# Patient Record
Sex: FEMALE | Race: BLACK OR AFRICAN AMERICAN | NOT HISPANIC OR LATINO | Employment: UNEMPLOYED | URBAN - METROPOLITAN AREA
[De-identification: names, ages, dates, MRNs, and addresses within clinical notes are randomized per-mention and may not be internally consistent; named-entity substitution may affect disease eponyms.]

---
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
## 2024-03-15 ENCOUNTER — HOSPITAL ENCOUNTER (EMERGENCY)
Facility: HOSPITAL | Age: 19
Discharge: LEFT BEFORE DISCHARGE | End: 2024-03-16

## 2024-03-15 VITALS
WEIGHT: 180.75 LBS | HEART RATE: 116 BPM | OXYGEN SATURATION: 99 % | DIASTOLIC BLOOD PRESSURE: 81 MMHG | TEMPERATURE: 100 F | SYSTOLIC BLOOD PRESSURE: 122 MMHG | RESPIRATION RATE: 16 BRPM

## 2024-03-15 DIAGNOSIS — R06.02 SOB (SHORTNESS OF BREATH): ICD-10-CM

## 2024-03-15 PROCEDURE — 99281 EMR DPT VST MAYX REQ PHY/QHP: CPT

## 2024-03-15 RX ORDER — IBUPROFEN 800 MG/1
800 TABLET ORAL
Status: DISCONTINUED | OUTPATIENT
Start: 2024-03-15 | End: 2024-03-16 | Stop reason: HOSPADM

## 2024-03-15 NOTE — FIRST PROVIDER EVALUATION
Medical screening examination initiated.  I have conducted a focused provider triage encounter, findings are as follows:    Brief history of present illness:  18-year-old female presents to emergency room for chest pain and shortness of breath x3 days    Vitals:    03/15/24 1854   BP: 122/81   BP Location: Right arm   Patient Position: Sitting   Pulse: (!) 116   Resp: 16   Temp: 100.3 °F (37.9 °C)   TempSrc: Oral   SpO2: 99%   Weight: 82 kg (180 lb 12.4 oz)       Pertinent physical exam:  Patient is sitting in family members lap, tachycardic    Brief workup plan:  Swabs x-ray    Preliminary workup initiated; this workup will be continued and followed by the physician or advanced practice provider that is assigned to the patient when roomed.

## 2024-03-17 LAB
OHS QRS DURATION: 72 MS
OHS QTC CALCULATION: 414 MS

## 2025-04-19 ENCOUNTER — HOSPITAL ENCOUNTER (EMERGENCY)
Facility: HOSPITAL | Age: 20
Discharge: HOME | End: 2025-04-19
Attending: EMERGENCY MEDICINE

## 2025-04-19 VITALS
OXYGEN SATURATION: 100 % | DIASTOLIC BLOOD PRESSURE: 74 MMHG | HEART RATE: 80 BPM | HEIGHT: 62 IN | BODY MASS INDEX: 31.65 KG/M2 | TEMPERATURE: 98 F | SYSTOLIC BLOOD PRESSURE: 128 MMHG | RESPIRATION RATE: 18 BRPM | WEIGHT: 172 LBS

## 2025-04-19 DIAGNOSIS — K59.00 CONSTIPATION DURING PREGNANCY IN FIRST TRIMESTER: Primary | ICD-10-CM

## 2025-04-19 DIAGNOSIS — O99.611 CONSTIPATION DURING PREGNANCY IN FIRST TRIMESTER: Primary | ICD-10-CM

## 2025-04-19 LAB
ABSOLUTE EOSINOPHIL (OHS): 0.02 K/UL
ABSOLUTE MONOCYTE (OHS): 0.35 K/UL (ref 0.3–1)
ABSOLUTE NEUTROPHIL COUNT (OHS): 4.51 K/UL (ref 1.8–7.7)
ALBUMIN SERPL BCP-MCNC: 4.1 G/DL (ref 3.5–5.2)
ALP SERPL-CCNC: 17 UNIT/L (ref 40–150)
ALT SERPL W/O P-5'-P-CCNC: 11 UNIT/L (ref 10–44)
ANION GAP (OHS): 10 MMOL/L (ref 8–16)
AST SERPL-CCNC: 14 UNIT/L (ref 11–45)
BASOPHILS # BLD AUTO: 0.01 K/UL
BASOPHILS NFR BLD AUTO: 0.2 %
BILIRUB SERPL-MCNC: 0.3 MG/DL (ref 0.1–1)
BILIRUB UR QL STRIP.AUTO: NEGATIVE
BUN SERPL-MCNC: 4 MG/DL (ref 6–20)
CALCIUM SERPL-MCNC: 9.6 MG/DL (ref 8.7–10.5)
CHLORIDE SERPL-SCNC: 102 MMOL/L (ref 95–110)
CLARITY UR: CLEAR
CO2 SERPL-SCNC: 22 MMOL/L (ref 23–29)
COLOR UR AUTO: YELLOW
CREAT SERPL-MCNC: 0.7 MG/DL (ref 0.5–1.4)
ERYTHROCYTE [DISTWIDTH] IN BLOOD BY AUTOMATED COUNT: 11.2 % (ref 11.5–14.5)
GFR SERPLBLD CREATININE-BSD FMLA CKD-EPI: >60 ML/MIN/1.73/M2
GLUCOSE SERPL-MCNC: 84 MG/DL (ref 70–110)
GLUCOSE UR QL STRIP: NEGATIVE
HCG INTACT+B SERPL-ACNC: NORMAL MIU/ML
HCT VFR BLD AUTO: 41.7 % (ref 37–48.5)
HCV AB SERPL QL IA: NEGATIVE
HGB BLD-MCNC: 14.4 GM/DL (ref 12–16)
HGB UR QL STRIP: NEGATIVE
HIV 1+2 AB+HIV1 P24 AG SERPL QL IA: NEGATIVE
HOLD SPECIMEN: NORMAL
IMM GRANULOCYTES # BLD AUTO: 0.02 K/UL (ref 0–0.04)
IMM GRANULOCYTES NFR BLD AUTO: 0.3 % (ref 0–0.5)
KETONES UR QL STRIP: ABNORMAL
LEUKOCYTE ESTERASE UR QL STRIP: NEGATIVE
LYMPHOCYTES # BLD AUTO: 1.66 K/UL (ref 1–4.8)
MCH RBC QN AUTO: 30.8 PG (ref 27–31)
MCHC RBC AUTO-ENTMCNC: 34.5 G/DL (ref 32–36)
MCV RBC AUTO: 89 FL (ref 82–98)
NITRITE UR QL STRIP: NEGATIVE
NUCLEATED RBC (/100WBC) (OHS): 0 /100 WBC
PH UR STRIP: 6 [PH]
PLATELET # BLD AUTO: 258 K/UL (ref 150–450)
PMV BLD AUTO: 9.6 FL (ref 9.2–12.9)
POTASSIUM SERPL-SCNC: 3.6 MMOL/L (ref 3.5–5.1)
PROT SERPL-MCNC: 8.1 GM/DL (ref 6–8.4)
PROT UR QL STRIP: NEGATIVE
RBC # BLD AUTO: 4.67 M/UL (ref 4–5.4)
RELATIVE EOSINOPHIL (OHS): 0.3 %
RELATIVE LYMPHOCYTE (OHS): 25.3 % (ref 18–48)
RELATIVE MONOCYTE (OHS): 5.3 % (ref 4–15)
RELATIVE NEUTROPHIL (OHS): 68.6 % (ref 38–73)
SODIUM SERPL-SCNC: 134 MMOL/L (ref 136–145)
SP GR UR STRIP: 1.02
UROBILINOGEN UR STRIP-ACNC: NEGATIVE EU/DL
WBC # BLD AUTO: 6.57 K/UL (ref 3.9–12.7)

## 2025-04-19 PROCEDURE — 82040 ASSAY OF SERUM ALBUMIN: CPT | Performed by: NURSE PRACTITIONER

## 2025-04-19 PROCEDURE — 81003 URINALYSIS AUTO W/O SCOPE: CPT | Performed by: NURSE PRACTITIONER

## 2025-04-19 PROCEDURE — 86803 HEPATITIS C AB TEST: CPT | Performed by: NURSE PRACTITIONER

## 2025-04-19 PROCEDURE — 87389 HIV-1 AG W/HIV-1&-2 AB AG IA: CPT | Performed by: NURSE PRACTITIONER

## 2025-04-19 PROCEDURE — 99283 EMERGENCY DEPT VISIT LOW MDM: CPT

## 2025-04-19 PROCEDURE — 85025 COMPLETE CBC W/AUTO DIFF WBC: CPT | Performed by: NURSE PRACTITIONER

## 2025-04-19 PROCEDURE — 84702 CHORIONIC GONADOTROPIN TEST: CPT | Performed by: NURSE PRACTITIONER

## 2025-04-19 RX ORDER — PSYLLIUM SEED
1 PACKET (EA) ORAL 2 TIMES DAILY PRN
Qty: 35 G | Refills: 0 | Status: SHIPPED | OUTPATIENT
Start: 2025-04-19

## 2025-04-20 NOTE — ED PROVIDER NOTES
Encounter Date: 4/19/2025       History     Chief Complaint   Patient presents with    Constipation     Patient 8-10 weeks pregnant, complaining of constipation and abdominal cramping. Tried over the counter medications with no relief. Denies any vaginal bleeding or discharge     19-year-old female presents emergency department for lower abdominal cramping and constipation and pregnancy.  Patient reports she has been taking stool softeners with minimal relief.  Patient reports she is approximately 8 weeks pregnant.  Patient denies any fever, chills, chest pain, shortness of breath, back pain, nausea, vomiting, dysuria, vaginal bleeding, vaginal discharge, and all other concerns at this time.  Patient was recently seen on April 6th had a different health care facility.  Patient was diagnosed with a intrauterine pregnancy at this time.    The history is provided by the patient and a parent. No  was used.     Review of patient's allergies indicates:  No Known Allergies  History reviewed. No pertinent past medical history.  History reviewed. No pertinent surgical history.  No family history on file.  Social History[1]  Review of Systems   Constitutional:  Negative for fever.   HENT:  Negative for sore throat.    Respiratory:  Negative for shortness of breath.    Cardiovascular:  Negative for chest pain.   Gastrointestinal:  Positive for abdominal pain. Negative for nausea and vomiting.   Genitourinary:  Negative for dysuria, vaginal bleeding and vaginal discharge.   Musculoskeletal:  Negative for back pain.   Skin:  Negative for rash.   Neurological:  Negative for weakness.   Hematological:  Does not bruise/bleed easily.       Physical Exam     Initial Vitals [04/19/25 1502]   BP Pulse Resp Temp SpO2   127/70 79 16 98.2 °F (36.8 °C) 100 %      MAP       --         Physical Exam    Nursing note and vitals reviewed.  Constitutional: She appears well-developed and well-nourished. She is not diaphoretic.  No distress.   HENT:   Head: Normocephalic and atraumatic.   Eyes: Right eye exhibits no discharge. Left eye exhibits no discharge.   Neck: Neck supple.   Normal range of motion.  Cardiovascular:  Normal rate.           Pulmonary/Chest: No respiratory distress.   Abdominal: She exhibits no distension.   Musculoskeletal:         General: Normal range of motion.      Cervical back: Normal range of motion and neck supple.     Neurological: She is alert and oriented to person, place, and time. She has normal strength.   Skin: Skin is warm and dry.   Psychiatric: She has a normal mood and affect. Her behavior is normal. Thought content normal.         ED Course   Procedures  Labs Reviewed   COMPREHENSIVE METABOLIC PANEL - Abnormal       Result Value    Sodium 134 (*)     Potassium 3.6      Chloride 102      CO2 22 (*)     Glucose 84      BUN 4 (*)     Creatinine 0.7      Calcium 9.6      Protein Total 8.1      Albumin 4.1      Bilirubin Total 0.3      ALP 17 (*)     AST 14      ALT 11      Anion Gap 10      eGFR >60     URINALYSIS, REFLEX TO URINE CULTURE - Abnormal    Color, UA Yellow      Appearance, UA Clear      pH, UA 6.0      Spec Grav UA 1.020      Protein, UA Negative      Glucose, UA Negative      Ketones, UA 2+ (*)     Bilirubin, UA Negative      Blood, UA Negative      Nitrites, UA Negative      Urobilinogen, UA Negative      Leukocyte Esterase, UA Negative     CBC WITH DIFFERENTIAL - Abnormal    WBC 6.57      RBC 4.67      HGB 14.4      HCT 41.7      MCV 89      MCH 30.8      MCHC 34.5      RDW 11.2 (*)     Platelet Count 258      MPV 9.6      Nucleated RBC 0      Neut % 68.6      Lymph % 25.3      Mono % 5.3      Eos % 0.3      Basophil % 0.2      Imm Grans % 0.3      Neut # 4.51      Lymph # 1.66      Mono # 0.35      Eos # 0.02      Baso # 0.01      Imm Grans # 0.02     HEPATITIS C ANTIBODY - Normal    Hep C Ab Interp Negative     HIV 1 / 2 ANTIBODY - Normal    HIV 1/2 Ag/Ab Negative     CBC W/ AUTO  DIFFERENTIAL    Narrative:     The following orders were created for panel order CBC auto differential.  Procedure                               Abnormality         Status                     ---------                               -----------         ------                     CBC with Differential[[id number]]        Abnormal            Final result                 Please view results for these tests on the individual orders.   HCG, QUANTITATIVE    Beta HCG Quant 55,356.27     GREY TOP URINE HOLD    Extra Tube Hold for add-ons.     HEP C VIRUS HOLD SPECIMEN          Imaging Results    None          Medications - No data to display  Medical Decision Making  Amount and/or Complexity of Data Reviewed  Labs: ordered.    Risk  OTC drugs.                                      Clinical Impression:  Final diagnoses:  [O99.611, K59.00] Constipation during pregnancy in first trimester (Primary)          ED Disposition Condition    Discharge Stable          ED Prescriptions       Medication Sig Dispense Start Date End Date Auth. Provider    psyllium husk (METAMUCIL) 3.4 gram/5.4 gram Powd Take 1 packet by mouth 2 (two) times daily as needed. 35 g 4/19/2025 -- Tacos Galeas NP          Follow-up Information       Follow up With Specialties Details Why Contact Info    pcp of choice   As needed     [facility] - Emergency Dept. Emergency Medicine  If symptoms worsen [address] [place] [address] [phone]               [1]   Social History  Tobacco Use    Smoking status: Never     Passive exposure: Never    Smokeless tobacco: Never   Substance Use Topics    Alcohol use: Never    Drug use: Never        Tacos Galeas NP  04/19/25 5790

## 2025-07-19 ENCOUNTER — HOSPITAL ENCOUNTER (OUTPATIENT)
Facility: HOSPITAL | Age: 20
Discharge: HOME | End: 2025-07-19
Attending: OBSTETRICS & GYNECOLOGY | Admitting: OBSTETRICS & GYNECOLOGY
Payer: MEDICAID

## 2025-07-19 VITALS
TEMPERATURE: 98 F | SYSTOLIC BLOOD PRESSURE: 109 MMHG | HEART RATE: 78 BPM | DIASTOLIC BLOOD PRESSURE: 68 MMHG | OXYGEN SATURATION: 100 %

## 2025-07-19 DIAGNOSIS — B96.89 BV (BACTERIAL VAGINOSIS): ICD-10-CM

## 2025-07-19 DIAGNOSIS — N76.0 BV (BACTERIAL VAGINOSIS): ICD-10-CM

## 2025-07-19 DIAGNOSIS — N93.9 VAGINAL SPOTTING: Primary | ICD-10-CM

## 2025-07-19 DIAGNOSIS — R10.9 ABDOMINAL CRAMPING: ICD-10-CM

## 2025-07-19 PROBLEM — R11.10 VOMITING AND DIARRHEA: Status: ACTIVE | Noted: 2025-07-19

## 2025-07-19 PROBLEM — R19.7 VOMITING AND DIARRHEA: Status: ACTIVE | Noted: 2025-07-19

## 2025-07-19 LAB
BACTERIA #/AREA URNS AUTO: ABNORMAL /HPF
BILIRUB UR QL STRIP.AUTO: NEGATIVE
CLARITY UR: ABNORMAL
COLOR UR AUTO: YELLOW
GLUCOSE UR QL STRIP: NEGATIVE
HGB UR QL STRIP: NEGATIVE
KETONES UR QL STRIP: NEGATIVE
LEUKOCYTE ESTERASE UR QL STRIP: ABNORMAL
MICROSCOPIC COMMENT: ABNORMAL
NITRITE UR QL STRIP: NEGATIVE
PH UR STRIP: 7 [PH]
PROT UR QL STRIP: ABNORMAL
RBC #/AREA URNS AUTO: 1 /HPF (ref 0–4)
SP GR UR STRIP: 1.02
SQUAMOUS #/AREA URNS AUTO: 62 /HPF
UROBILINOGEN UR STRIP-ACNC: NEGATIVE EU/DL
WBC #/AREA URNS AUTO: 19 /HPF (ref 0–5)

## 2025-07-19 PROCEDURE — 96374 THER/PROPH/DIAG INJ IV PUSH: CPT

## 2025-07-19 PROCEDURE — 96365 THER/PROPH/DIAG IV INF INIT: CPT

## 2025-07-19 PROCEDURE — 63600175 PHARM REV CODE 636 W HCPCS

## 2025-07-19 PROCEDURE — 87086 URINE CULTURE/COLONY COUNT: CPT

## 2025-07-19 PROCEDURE — 99211 OFF/OP EST MAY X REQ PHY/QHP: CPT

## 2025-07-19 PROCEDURE — 96360 HYDRATION IV INFUSION INIT: CPT

## 2025-07-19 PROCEDURE — 99214 OFFICE O/P EST MOD 30 MIN: CPT | Mod: TH,,,

## 2025-07-19 PROCEDURE — 81001 URINALYSIS AUTO W/SCOPE: CPT

## 2025-07-19 PROCEDURE — 25000003 PHARM REV CODE 250

## 2025-07-19 RX ORDER — LOPERAMIDE HYDROCHLORIDE 2 MG/1
2 CAPSULE ORAL 4 TIMES DAILY PRN
Status: DISCONTINUED | OUTPATIENT
Start: 2025-07-19 | End: 2025-07-19 | Stop reason: HOSPADM

## 2025-07-19 RX ORDER — ACETAMINOPHEN 500 MG
500 TABLET ORAL EVERY 6 HOURS PRN
COMMUNITY

## 2025-07-19 RX ORDER — METRONIDAZOLE 500 MG/100ML
500 INJECTION, SOLUTION INTRAVENOUS ONCE
Status: COMPLETED | OUTPATIENT
Start: 2025-07-19 | End: 2025-07-19

## 2025-07-19 RX ORDER — ONDANSETRON HYDROCHLORIDE 2 MG/ML
4 INJECTION, SOLUTION INTRAVENOUS ONCE
Status: COMPLETED | OUTPATIENT
Start: 2025-07-19 | End: 2025-07-19

## 2025-07-19 RX ORDER — METRONIDAZOLE 7.5 MG/G
1 GEL VAGINAL 2 TIMES DAILY
Qty: 70 G | Refills: 0 | Status: SHIPPED | OUTPATIENT
Start: 2025-07-19 | End: 2025-07-26

## 2025-07-19 RX ORDER — ONDANSETRON 4 MG/1
4 TABLET, ORALLY DISINTEGRATING ORAL EVERY 6 HOURS PRN
Qty: 60 TABLET | Refills: 0 | Status: SHIPPED | OUTPATIENT
Start: 2025-07-19

## 2025-07-19 RX ADMIN — ONDANSETRON 4 MG: 2 INJECTION INTRAMUSCULAR; INTRAVENOUS at 09:07

## 2025-07-19 RX ADMIN — METRONIDAZOLE 500 MG: 5 INJECTION, SOLUTION INTRAVENOUS at 09:07

## 2025-07-19 RX ADMIN — LOPERAMIDE HYDROCHLORIDE 2 MG: 2 CAPSULE ORAL at 08:07

## 2025-07-19 RX ADMIN — SODIUM CHLORIDE, POTASSIUM CHLORIDE, SODIUM LACTATE AND CALCIUM CHLORIDE 1000 ML: 600; 310; 30; 20 INJECTION, SOLUTION INTRAVENOUS at 09:07

## 2025-07-19 NOTE — HPI
19 y.o.  19w3d with c/o abdominal cramping with spotting yesterday. Then started with N/V/D this morning. Has not tried to eat yet.

## 2025-07-19 NOTE — DISCHARGE INSTRUCTIONS

## 2025-07-19 NOTE — ASSESSMENT & PLAN NOTE
EFM  Speculum exam performed. Small amount of thick white discharge noted in vaginal vault.  Cervix appears closed. No evidence of bleeding noted. Wet prep collected. +clue cells  Will treat for BV   UA

## 2025-07-19 NOTE — SUBJECTIVE & OBJECTIVE
Obstetric HPI:  Patient reports cramping, active fetal movement, Yes vaginal bleeding , No loss of fluid     This pregnancy has been uncomplicated    OB History    Para Term  AB Living   1 0 0 0 0 0   SAB IAB Ectopic Multiple Live Births   0 0 0 0 0      # Outcome Date GA Lbr Deric/2nd Weight Sex Type Anes PTL Lv   1 Current              History reviewed. No pertinent past medical history.  History reviewed. No pertinent surgical history.    PTA Medications   Medication Sig    acetaminophen (TYLENOL) 500 MG tablet Take 500 mg by mouth every 6 (six) hours as needed for Pain.    psyllium husk (METAMUCIL) 3.4 gram/5.4 gram Powd Take 1 packet by mouth 2 (two) times daily as needed.       Review of patient's allergies indicates:  No Known Allergies     Family History    None       Tobacco Use    Smoking status: Never     Passive exposure: Never    Smokeless tobacco: Never   Substance and Sexual Activity    Alcohol use: Never    Drug use: Never    Sexual activity: Yes     Partners: Male     Review of Systems   Gastrointestinal:  Positive for abdominal pain.   Genitourinary:  Positive for vaginal bleeding.   All other systems reviewed and are negative.     Objective:     Vital Signs (Most Recent):  Pulse: 75 (25 0717)  BP: (!) 113/59 (25 0717)  SpO2: 100 % (25 0716) Vital Signs (24h Range):  Pulse:  [51-79] 75  SpO2:  [76 %-100 %] 100 %  BP: (113-118)/(57-59) 113/59        There is no height or weight on file to calculate BMI.    FHT: 140  TOCO:  none     Physical Exam:   Constitutional: She is oriented to person, place, and time. She appears well-developed and well-nourished.       Cardiovascular:  Normal rate.             Pulmonary/Chest: Effort normal. No respiratory distress.        Abdominal: Soft.     Genitourinary:    Uterus normal.   There is vaginal discharge in the vagina.           Musculoskeletal: Moves all extremeties. No edema.       Neurological: She is alert and oriented to  "person, place, and time.    Skin: Skin is warm and dry.    Psychiatric: She has a normal mood and affect.        Cervix: per speculum   Dilation:  0       Significant Labs:  No results found for: "GROUPTRH", "HEPBSAG", "RUBELLAIGGSC", "STREPBCULT", "AFP", "[id number]"    I have personallly reviewed all pertinent lab results from the last 24 hours.  "

## 2025-07-19 NOTE — H&P
[facility] - Labor & Delivery  Obstetrics  History & Physical    Patient Name: Mazin Ramos  MRN: [id number]  Admission Date: 2025  Primary Care Provider: Dustin Le MD    Subjective:     Principal Problem:Vaginal spotting    History of Present Illness:  19 y.o.  19w3d with c/o abdominal cramping with spotting yesterday. Then started with N/V/D this morning. Has not tried to eat yet.        Obstetric HPI:  Patient reports cramping, active fetal movement, Yes vaginal bleeding , No loss of fluid     This pregnancy has been uncomplicated    OB History    Para Term  AB Living   1 0 0 0 0 0   SAB IAB Ectopic Multiple Live Births   0 0 0 0 0      # Outcome Date GA Lbr Deric/2nd Weight Sex Type Anes PTL Lv   1 Current              History reviewed. No pertinent past medical history.  History reviewed. No pertinent surgical history.    PTA Medications   Medication Sig    acetaminophen (TYLENOL) 500 MG tablet Take 500 mg by mouth every 6 (six) hours as needed for Pain.    psyllium husk (METAMUCIL) 3.4 gram/5.4 gram Powd Take 1 packet by mouth 2 (two) times daily as needed.       Review of patient's allergies indicates:  No Known Allergies     Family History    None       Tobacco Use    Smoking status: Never     Passive exposure: Never    Smokeless tobacco: Never   Substance and Sexual Activity    Alcohol use: Never    Drug use: Never    Sexual activity: Yes     Partners: Male     Review of Systems   Gastrointestinal:  Positive for abdominal pain.   Genitourinary:  Positive for vaginal bleeding.   All other systems reviewed and are negative.     Objective:     Vital Signs (Most Recent):  Pulse: 75 (25 0717)  BP: (!) 113/59 (25 0717)  SpO2: 100 % (25 0716) Vital Signs (24h Range):  Pulse:  [51-79] 75  SpO2:  [76 %-100 %] 100 %  BP: (113-118)/(57-59) 113/59        There is no height or weight on file to calculate BMI.    FHT: 140  TOCO:  none     Physical Exam:  "  Constitutional: She is oriented to person, place, and time. She appears well-developed and well-nourished.       Cardiovascular:  Normal rate.             Pulmonary/Chest: Effort normal. No respiratory distress.        Abdominal: Soft.     Genitourinary:    Uterus normal.   There is vaginal discharge in the vagina.           Musculoskeletal: Moves all extremeties. No edema.       Neurological: She is alert and oriented to person, place, and time.    Skin: Skin is warm and dry.    Psychiatric: She has a normal mood and affect.        Cervix: per speculum   Dilation:  0       Significant Labs:  No results found for: "GROUPTRH", "HEPBSAG", "RUBELLAIGGSC", "STREPBCULT", "AFP", "OUUUGNT2CQ"    I have personallly reviewed all pertinent lab results from the last 24 hours.  Assessment/Plan:     19 y.o. female  at 19w3d for:    * Vaginal spotting  EFM  Speculum exam performed. Small amount of thick white discharge noted in vaginal vault.  Cervix appears closed. No evidence of bleeding noted. Wet prep collected. +clue cells  Will treat for BV   UA    Abdominal cramping  EFM  IV fluid bolus  Zofran for nausea  Imodium for diarrhea  Speculum exam performed. Small amount of thick white discharge noted in vaginal vault.  Cervix appears closed. No evidence of bleeding noted. Wet prep collected. +clue cells  Will treat for BV   UA    Vomiting and diarrhea  EFM  IV fluid bolus  Zofran for nausea  Imodium for diarrhea          Fabiola Mcdonald CNM  Obstetrics  [facility] - Labor [facility] Delivery        "

## 2025-07-19 NOTE — ASSESSMENT & PLAN NOTE
EFM  IV fluid bolus  Zofran for nausea  Imodium for diarrhea  Speculum exam performed. Small amount of thick white discharge noted in vaginal vault.  Cervix appears closed. No evidence of bleeding noted. Wet prep collected. +clue cells  Will treat for BV   UA

## 2025-07-19 NOTE — HOSPITAL COURSE
EFM  IV fluid bolus  Zofran for nausea  Imodium for diarrhea  Speculum exam performed. Small amount of thick white discharge noted in vaginal vault.  Cervix appears closed. No evidence of bleeding noted. Wet prep collected. +clue cells  Will treat for BV   UA    7/19/25 @ 1029:  Patient reports feeling a little   Able to keep food down without issue  Flgayl cream sent to pharmacy  UA culture pending. If UTI diagnosed with send abx to pharmacy  Stable for discharge   Discharge instructions. Return to LD if experiencing decreased fetal movement, leaking of fluid, contractions, and/or vaginal bleeding    Keep next scheduled  appt

## 2025-07-19 NOTE — DISCHARGE SUMMARY
[facility] - [facility]  Obstetrics  Discharge Summary      Patient Name: Mazin Ramos  MRN: [id number]  Admission Date: 2025  Hospital Length of Stay: 0 days  Discharge Date and Time: 2025 10:34 AM  Attending Physician: Flora Huitron,*   Discharging Provider: Fabiola Mcdonald CNM   Primary Care Provider: Dustin Le MD    HPI: 19 y.o.  19w3d with c/o abdominal cramping with spotting yesterday. Then started with N/V/D this morning. Has not tried to eat yet.        FHT: 145  TOCO: none    * No surgery found *     Hospital Course:   EFM  IV fluid bolus  Zofran for nausea  Imodium for diarrhea  Speculum exam performed. Small amount of thick white discharge noted in vaginal vault.  Cervix appears closed. No evidence of bleeding noted. Wet prep collected. +clue cells  Will treat for BV   UA    25 @ 1029:  Patient reports feeling a little   Able to keep food down without issue  Flgayl cream sent to pharmacy  UA culture pending. If UTI diagnosed with send abx to pharmacy  Stable for discharge   Discharge instructions. Return to LD if experiencing decreased fetal movement, leaking of fluid, contractions, and/or vaginal bleeding    Keep next scheduled  appt          Final Active Diagnoses:    Diagnosis Date Noted POA    PRINCIPAL PROBLEM:  Vaginal spotting [N93.9] 2025 Unknown    Vomiting and diarrhea [R11.10, R19.7] 2025 Unknown    Abdominal cramping [R10.9] 2025 Unknown      Problems Resolved During this Admission:        Significant Diagnostic Studies: Labs: All labs within the past 24 hours have been reviewed    Immunizations       None            This patient has no babies on file.  Pending Diagnostic Studies:       Procedure Component Value Units Date/Time    GRE[place] URINE HOLD [[id number]] Collected: 25 0829    Order Status: Sent Lab Status: In process Updated: 25 0838    Specimen: Urine             Discharged Condition: good    Disposition: Home  or Self Care    Follow Up:    Patient Instructions:      Notify your health care provider if you experience any of the following:  temperature >100.4     Notify your health care provider if you experience any of the following:  persistent nausea and vomiting or diarrhea     Notify your health care provider if you experience any of the following:  severe uncontrolled pain     Notify your health care provider if you experience any of the following:  difficulty breathing or increased cough     Notify your health care provider if you experience any of the following:  severe persistent headache     Notify your health care provider if you experience any of the following:  persistent dizziness, light-headedness, or visual disturbances     Notify your health care provider if you experience any of the following:  increased confusion or weakness     Notify your health care provider if you experience any of the following:   Order Comments: Return to LD if experiencing decreased fetal movement, leaking of fluid, contractions, and/or vaginal bleeding     Activity as tolerated     Medications:  Current Discharge Medication List        START taking these medications    Details   metroNIDAZOLE (METROGEL) 0.75 % (37.5mg/5 gram) vaginal gel Place 1 applicator vaginally 2 (two) times daily. for 7 days  Qty: 70 g, Refills: 0           CONTINUE these medications which have NOT CHANGED    Details   acetaminophen (TYLENOL) 500 MG tablet Take 500 mg by mouth every 6 (six) hours as needed for Pain.      psyllium husk (METAMUCIL) 3.4 gram/5.4 gram Powd Take 1 packet by mouth 2 (two) times daily as needed.  Qty: 35 g, Refills: 0    Comments: 3.5g packet bid prn. Disp 10 packets             Fabiola Mcdonald CNM  Obstetrics  [facility]

## 2025-07-20 LAB
BACTERIA UR CULT: NORMAL
HOLD SPECIMEN: NORMAL